# Patient Record
Sex: FEMALE | Race: WHITE | NOT HISPANIC OR LATINO | Employment: OTHER | URBAN - METROPOLITAN AREA
[De-identification: names, ages, dates, MRNs, and addresses within clinical notes are randomized per-mention and may not be internally consistent; named-entity substitution may affect disease eponyms.]

---

## 2017-08-11 RX ORDER — AMLODIPINE BESYLATE 5 MG/1
TABLET ORAL
Qty: 90 TABLET | Refills: 0 | OUTPATIENT
Start: 2017-08-11

## 2017-08-11 RX ORDER — BISOPROLOL FUMARATE 5 MG/1
TABLET, FILM COATED ORAL
Qty: 90 TABLET | Refills: 0 | OUTPATIENT
Start: 2017-08-11

## 2017-08-11 RX ORDER — LISINOPRIL AND HYDROCHLOROTHIAZIDE 20; 12.5 MG/1; MG/1
TABLET ORAL
Qty: 90 TABLET | Refills: 0 | OUTPATIENT
Start: 2017-08-11

## 2017-08-24 ENCOUNTER — OFFICE VISIT (OUTPATIENT)
Dept: INTERNAL MEDICINE | Facility: CLINIC | Age: 77
End: 2017-08-24

## 2017-08-24 VITALS
WEIGHT: 145.6 LBS | DIASTOLIC BLOOD PRESSURE: 62 MMHG | HEIGHT: 64 IN | HEART RATE: 56 BPM | TEMPERATURE: 98 F | SYSTOLIC BLOOD PRESSURE: 150 MMHG | BODY MASS INDEX: 24.86 KG/M2 | OXYGEN SATURATION: 96 %

## 2017-08-24 DIAGNOSIS — Z12.39 BREAST CANCER SCREENING: ICD-10-CM

## 2017-08-24 DIAGNOSIS — I10 ESSENTIAL HYPERTENSION: Primary | ICD-10-CM

## 2017-08-24 DIAGNOSIS — Z12.31 ENCOUNTER FOR SCREENING MAMMOGRAM FOR MALIGNANT NEOPLASM OF BREAST: ICD-10-CM

## 2017-08-24 DIAGNOSIS — Z72.0 TOBACCO ABUSE: ICD-10-CM

## 2017-08-24 PROCEDURE — 99214 OFFICE O/P EST MOD 30 MIN: CPT | Performed by: FAMILY MEDICINE

## 2017-08-24 RX ORDER — BISOPROLOL FUMARATE 5 MG/1
5 TABLET, FILM COATED ORAL DAILY
Qty: 90 TABLET | Refills: 3 | Status: SHIPPED | OUTPATIENT
Start: 2017-08-24 | End: 2018-11-21 | Stop reason: SDUPTHER

## 2017-08-24 RX ORDER — AMLODIPINE BESYLATE 5 MG/1
5 TABLET ORAL DAILY
Qty: 90 TABLET | Refills: 3 | Status: SHIPPED | OUTPATIENT
Start: 2017-08-24 | End: 2018-11-21 | Stop reason: SDUPTHER

## 2017-08-24 RX ORDER — LISINOPRIL AND HYDROCHLOROTHIAZIDE 20; 12.5 MG/1; MG/1
1 TABLET ORAL DAILY
Qty: 90 TABLET | Refills: 3 | Status: SHIPPED | OUTPATIENT
Start: 2017-08-24 | End: 2017-08-28 | Stop reason: SINTOL

## 2017-08-24 NOTE — PROGRESS NOTES
Neeru Reid is a 77 y.o. female.  Seen 08/24/2017    Assessment/Plan   Problem List Items Addressed This Visit        Cardiovascular and Mediastinum    BP (high blood pressure) - Primary    Relevant Medications    lisinopril-hydrochlorothiazide (PRINZIDE,ZESTORETIC) 20-12.5 MG per tablet    bisoprolol (ZEBeta) 5 MG tablet    amLODIPine (NORVASC) 5 MG tablet    Other Relevant Orders    Comprehensive Metabolic Panel       Other    Tobacco abuse    Breast cancer screening    Relevant Orders    Mammo Screening Bilateral With CAD    Encounter for screening mammogram for malignant neoplasm of breast     Relevant Orders    Mammo Screening Bilateral With CAD           Follow-up results, monitor blood pressure goals is 140/90 smoking cessation recommended      Subjective     Chief Complaint   Patient presents with   • Follow up to hypertension     Patient needs medication refills   Health maintenance smoker  Social History   Substance Use Topics   • Smoking status: Current Some Day Smoker     Packs/day: 0.50     Types: Cigarettes   • Smokeless tobacco: Never Used   • Alcohol use No       History of Present Illness   Patient has long-standing hypertension well controlled on beta blocker and calcium channel blocker ARB diuretic no chest pain or shortness of breath she golfs regularly she has no increasing fatigue she smokes one half pack cigarettes over the course of a few days she has no cough or fever or weight loss she does not want any immunizations at this time and she is okay to have a mammogram as part of her health maintenance  The following portions of the patient's history were reviewed and updated as appropriate:PMHroutine: Social history , Past Medical History, Allergies, Current Medications, Active Problem List and Health Maintenance    Review of Systems   Constitutional: Negative for appetite change, fever and unexpected weight change.   HENT: Negative for ear pain, facial swelling and sore throat.    Eyes:  "Negative for pain and visual disturbance.   Respiratory: Negative for chest tightness, shortness of breath and wheezing.    Cardiovascular: Negative for chest pain and palpitations.   Gastrointestinal: Negative for abdominal pain and blood in stool.   Endocrine: Negative.    Genitourinary: Negative for difficulty urinating and hematuria.   Musculoskeletal: Negative for joint swelling.   Neurological: Negative for tremors, seizures and syncope.   Hematological: Negative for adenopathy.   Psychiatric/Behavioral: Negative.        Objective   Vitals:    08/24/17 1331   BP: 150/62   BP Location: Left arm   Patient Position: Sitting   Cuff Size: Adult   Pulse: 56   Temp: 98 °F (36.7 °C)   TempSrc: Oral   SpO2: 96%   Weight: 145 lb 9.6 oz (66 kg)   Height: 64\" (162.6 cm)     Body mass index is 24.99 kg/(m^2).  Physical Exam   Constitutional: She is oriented to person, place, and time. She appears well-developed and well-nourished. No distress.   HENT:   Head: Normocephalic and atraumatic.   Eyes: Conjunctivae and EOM are normal. Pupils are equal, round, and reactive to light. Right eye exhibits no discharge. Left eye exhibits no discharge. No scleral icterus.   Neck: Normal carotid pulses and no JVD present.   Cardiovascular: Normal rate, regular rhythm, normal heart sounds, intact distal pulses and normal pulses.  Exam reveals no gallop.    No murmur heard.  Pulmonary/Chest: Effort normal and breath sounds normal. No respiratory distress. She has no wheezes. She has no rales.   Abdominal: Soft. There is no tenderness.   Musculoskeletal: Normal range of motion.   Neurological: She is alert and oriented to person, place, and time. She exhibits normal muscle tone. Coordination normal.   Skin: Skin is warm. No rash noted. No erythema. No pallor.   Psychiatric: She has a normal mood and affect. Her behavior is normal. Judgment and thought content normal.   Nursing note and vitals reviewed.    Reviewed Data:  No visits with " results within 1 Month(s) from this visit.  Latest known visit with results is:    Office Visit on 04/29/2016   Component Date Value Ref Range Status   • Glucose 04/29/2016 87  65 - 99 mg/dL Final    Comment: The MDRD GFR formula is only valid for adults with stable  renal function between ages 18 and 70.     • BUN 04/29/2016 28* 8 - 23 mg/dL Final   • Creatinine 04/29/2016 1.14* 0.57 - 1.00 mg/dL Final   • eGFR Non African Am 04/29/2016 46* >60 mL/min/1.73 Final   • eGFR African Am 04/29/2016 56* >60 mL/min/1.73 Final   • BUN/Creatinine Ratio 04/29/2016 24.6  7.0 - 25.0 Final   • Sodium 04/29/2016 146* 136 - 145 mmol/L Final   • Potassium 04/29/2016 4.5  3.5 - 5.2 mmol/L Final   • Chloride 04/29/2016 105  98 - 107 mmol/L Final   • Total CO2 04/29/2016 27.6  22.0 - 29.0 mmol/L Final   • Calcium 04/29/2016 10.0  8.6 - 10.5 mg/dL Final   • Total Protein 04/29/2016 7.4  6.0 - 8.5 g/dL Final   • Albumin 04/29/2016 4.50  3.50 - 5.20 g/dL Final   • Globulin 04/29/2016 2.9  gm/dL Final   • A/G Ratio 04/29/2016 1.6  g/dL Final   • Total Bilirubin 04/29/2016 0.2  0.1 - 1.2 mg/dL Final   • Alkaline Phosphatase 04/29/2016 93  39 - 117 U/L Final   • AST (SGOT) 04/29/2016 23  1 - 32 U/L Final   • ALT (SGPT) 04/29/2016 17  1 - 33 U/L Final

## 2017-08-25 ENCOUNTER — TELEPHONE (OUTPATIENT)
Dept: INTERNAL MEDICINE | Facility: CLINIC | Age: 77
End: 2017-08-25

## 2017-08-25 LAB
ALBUMIN SERPL-MCNC: 4.5 G/DL (ref 3.5–5.2)
ALBUMIN/GLOB SERPL: 1.5 G/DL
ALP SERPL-CCNC: 84 U/L (ref 39–117)
ALT SERPL-CCNC: 15 U/L (ref 1–33)
AST SERPL-CCNC: 13 U/L (ref 1–32)
BILIRUB SERPL-MCNC: 0.3 MG/DL (ref 0.1–1.2)
BUN SERPL-MCNC: 27 MG/DL (ref 8–23)
BUN/CREAT SERPL: 20.9 (ref 7–25)
CALCIUM SERPL-MCNC: 9.9 MG/DL (ref 8.6–10.5)
CHLORIDE SERPL-SCNC: 105 MMOL/L (ref 98–107)
CO2 SERPL-SCNC: 24.4 MMOL/L (ref 22–29)
CREAT SERPL-MCNC: 1.29 MG/DL (ref 0.57–1)
GLOBULIN SER CALC-MCNC: 3 GM/DL
GLUCOSE SERPL-MCNC: 99 MG/DL (ref 65–99)
POTASSIUM SERPL-SCNC: 4.8 MMOL/L (ref 3.5–5.2)
PROT SERPL-MCNC: 7.5 G/DL (ref 6–8.5)
SODIUM SERPL-SCNC: 143 MMOL/L (ref 136–145)

## 2017-08-25 NOTE — TELEPHONE ENCOUNTER
----- Message from Jigar Sneed MD sent at 8/25/2017  1:10 PM EDT -----  Stop Prinzide 20/12.5 start lisinopril 20 mg daily #30 RF 3,  appt 1 month

## 2017-08-28 RX ORDER — LISINOPRIL 20 MG/1
TABLET ORAL
Qty: 90 TABLET | Refills: 0 | Status: SHIPPED | OUTPATIENT
Start: 2017-08-28 | End: 2018-09-20 | Stop reason: ALTCHOICE

## 2017-08-28 RX ORDER — LISINOPRIL 20 MG/1
20 TABLET ORAL DAILY
Qty: 30 TABLET | Refills: 0 | Status: SHIPPED | OUTPATIENT
Start: 2017-08-28 | End: 2017-08-28 | Stop reason: SDUPTHER

## 2017-09-06 ENCOUNTER — TELEPHONE (OUTPATIENT)
Dept: INTERNAL MEDICINE | Facility: CLINIC | Age: 77
End: 2017-09-06

## 2017-09-06 DIAGNOSIS — Z12.31 ENCOUNTER FOR SCREENING MAMMOGRAM FOR BREAST CANCER: Primary | ICD-10-CM

## 2017-09-06 NOTE — TELEPHONE ENCOUNTER
"----- Message from Micaela Salazar MA sent at 9/5/2017  5:36 PM EDT -----  Regarding: Cancellation of Order # [id number]  Order number [id number] for the procedure MAMMO SCREENING BILATERAL  W CAD [[id number]] has been canceled by Micaela Salazar MA   [[id number]]. This procedure was ordered by you on Aug 24, 2017 for   the patient Neeru Reid [[id number]]. The reason for   cancellation was \"None\".    This was a future order.  "

## 2018-01-05 ENCOUNTER — OFFICE VISIT (OUTPATIENT)
Dept: INTERNAL MEDICINE | Facility: CLINIC | Age: 78
End: 2018-01-05

## 2018-01-05 VITALS
RESPIRATION RATE: 16 BRPM | DIASTOLIC BLOOD PRESSURE: 62 MMHG | TEMPERATURE: 97 F | HEIGHT: 64 IN | SYSTOLIC BLOOD PRESSURE: 161 MMHG | BODY MASS INDEX: 24.69 KG/M2 | HEART RATE: 51 BPM | OXYGEN SATURATION: 98 % | WEIGHT: 144.6 LBS

## 2018-01-05 DIAGNOSIS — L30.9 ECZEMA, UNSPECIFIED TYPE: Primary | ICD-10-CM

## 2018-01-05 PROCEDURE — 99213 OFFICE O/P EST LOW 20 MIN: CPT | Performed by: FAMILY MEDICINE

## 2018-01-05 RX ORDER — TRIAMCINOLONE ACETONIDE 1 MG/G
CREAM TOPICAL 2 TIMES DAILY
Qty: 45 G | Refills: 1 | Status: SHIPPED | OUTPATIENT
Start: 2018-01-05 | End: 2018-09-20 | Stop reason: SDUPTHER

## 2018-01-05 NOTE — PROGRESS NOTES
"Neeru Reid is a 78 y.o. female.      Assessment/Plan   Problem List Items Addressed This Visit        Musculoskeletal and Integument    Eczema - Primary    Relevant Medications    triamcinolone (KENALOG) 0.1 % cream           5 Lubriderm twice daily applied triamcinolone twice a day specifically to the neck and the lower legs isn't does not improve within 2 weeks follow-up appointment otherwise consultation with dermatology      Chief Complaint   Patient presents with   • Rash     neck, arms, legs x 6 days. red and itching     Social History   Substance Use Topics   • Smoking status: Current Some Day Smoker     Packs/day: 0.50     Types: Cigarettes   • Smokeless tobacco: Never Used   • Alcohol use No       History of Present Illness   Rash started approximately a week ago neck arms and legs bilaterally some itching she has used over-the-counter Benadryl zinc oxide cream minimal improvement she is moisturizing soaps showers every other day no other change in routine her habits no fevers chills shakes  The following portions of the patient's history were reviewed and updated as appropriate:PMHroutine: Social history , Past Medical History, Allergies, Current Medications, Active Problem List and Health Maintenance    Review of Systems   Constitutional: Negative.    Allergic/Immunologic: Negative.    Hematological: Negative.        Objective   Vitals:    01/05/18 0915   BP: 161/62   BP Location: Left arm   Patient Position: Sitting   Cuff Size: Adult   Pulse: 51   Resp: 16   Temp: 97 °F (36.1 °C)   TempSrc: Oral   SpO2: 98%   Weight: 65.6 kg (144 lb 9.6 oz)   Height: 162.6 cm (64\")     Body mass index is 24.82 kg/(m^2).  Physical Exam   Constitutional: She appears well-developed and well-nourished.   HENT:   Head: Normocephalic and atraumatic.   Cardiovascular: Normal rate and regular rhythm.    Skin:   Erythema with some confluence at the posterior neck and bilateral lower arms and lower legs   Psychiatric: She has " a normal mood and affect. Her behavior is normal. Judgment and thought content normal.   Nursing note and vitals reviewed.    Reviewed Data:  No visits with results within 1 Month(s) from this visit.  Latest known visit with results is:    Office Visit on 08/24/2017   Component Date Value Ref Range Status   • Glucose 08/24/2017 99  65 - 99 mg/dL Final   • BUN 08/24/2017 27* 8 - 23 mg/dL Final   • Creatinine 08/24/2017 1.29* 0.57 - 1.00 mg/dL Final   • eGFR Non  Am 08/24/2017 40* >60 mL/min/1.73 Final    Comment: The MDRD GFR formula is only valid for adults with stable  renal function between ages 18 and 70.     • eGFR  Am 08/24/2017 49* >60 mL/min/1.73 Final   • BUN/Creatinine Ratio 08/24/2017 20.9  7.0 - 25.0 Final   • Sodium 08/24/2017 143  136 - 145 mmol/L Final   • Potassium 08/24/2017 4.8  3.5 - 5.2 mmol/L Final   • Chloride 08/24/2017 105  98 - 107 mmol/L Final   • Total CO2 08/24/2017 24.4  22.0 - 29.0 mmol/L Final   • Calcium 08/24/2017 9.9  8.6 - 10.5 mg/dL Final   • Total Protein 08/24/2017 7.5  6.0 - 8.5 g/dL Final   • Albumin 08/24/2017 4.50  3.50 - 5.20 g/dL Final   • Globulin 08/24/2017 3.0  gm/dL Final   • A/G Ratio 08/24/2017 1.5  g/dL Final   • Total Bilirubin 08/24/2017 0.3  0.1 - 1.2 mg/dL Final   • Alkaline Phosphatase 08/24/2017 84  39 - 117 U/L Final   • AST (SGOT) 08/24/2017 13  1 - 32 U/L Final   • ALT (SGPT) 08/24/2017 15  1 - 33 U/L Final

## 2018-09-20 ENCOUNTER — OFFICE VISIT (OUTPATIENT)
Dept: INTERNAL MEDICINE | Facility: CLINIC | Age: 78
End: 2018-09-20

## 2018-09-20 VITALS
SYSTOLIC BLOOD PRESSURE: 150 MMHG | DIASTOLIC BLOOD PRESSURE: 56 MMHG | TEMPERATURE: 98.2 F | OXYGEN SATURATION: 97 % | BODY MASS INDEX: 23.83 KG/M2 | HEIGHT: 64 IN | HEART RATE: 54 BPM | WEIGHT: 139.6 LBS

## 2018-09-20 DIAGNOSIS — Z72.0 TOBACCO ABUSE: ICD-10-CM

## 2018-09-20 DIAGNOSIS — I10 ESSENTIAL HYPERTENSION: Primary | ICD-10-CM

## 2018-09-20 DIAGNOSIS — L30.9 ECZEMA, UNSPECIFIED TYPE: ICD-10-CM

## 2018-09-20 DIAGNOSIS — L40.9 PSORIASIS: ICD-10-CM

## 2018-09-20 DIAGNOSIS — R79.89 CREATININE ELEVATION: ICD-10-CM

## 2018-09-20 LAB
ALBUMIN SERPL-MCNC: 4.4 G/DL (ref 3.5–5.2)
ALBUMIN/GLOB SERPL: 1.5 G/DL
ALP SERPL-CCNC: 89 U/L (ref 39–117)
ALT SERPL-CCNC: 20 U/L (ref 1–33)
AST SERPL-CCNC: 22 U/L (ref 1–32)
BILIRUB SERPL-MCNC: 0.4 MG/DL (ref 0.1–1.2)
BUN SERPL-MCNC: 30 MG/DL (ref 8–23)
BUN/CREAT SERPL: 22.6 (ref 7–25)
CALCIUM SERPL-MCNC: 10 MG/DL (ref 8.6–10.5)
CHLORIDE SERPL-SCNC: 107 MMOL/L (ref 98–107)
CO2 SERPL-SCNC: 23.8 MMOL/L (ref 22–29)
CREAT SERPL-MCNC: 1.33 MG/DL (ref 0.57–1)
GLOBULIN SER CALC-MCNC: 2.9 GM/DL
GLUCOSE SERPL-MCNC: 90 MG/DL (ref 65–99)
POTASSIUM SERPL-SCNC: 4.7 MMOL/L (ref 3.5–5.2)
PROT SERPL-MCNC: 7.3 G/DL (ref 6–8.5)
SODIUM SERPL-SCNC: 144 MMOL/L (ref 136–145)

## 2018-09-20 PROCEDURE — G0438 PPPS, INITIAL VISIT: HCPCS | Performed by: FAMILY MEDICINE

## 2018-09-20 PROCEDURE — 99214 OFFICE O/P EST MOD 30 MIN: CPT | Performed by: FAMILY MEDICINE

## 2018-09-20 PROCEDURE — 99406 BEHAV CHNG SMOKING 3-10 MIN: CPT | Performed by: FAMILY MEDICINE

## 2018-09-20 PROCEDURE — 96160 PT-FOCUSED HLTH RISK ASSMT: CPT | Performed by: FAMILY MEDICINE

## 2018-09-20 RX ORDER — LISINOPRIL AND HYDROCHLOROTHIAZIDE 20; 12.5 MG/1; MG/1
1 TABLET ORAL DAILY
COMMUNITY
Start: 2018-07-25 | End: 2018-09-25 | Stop reason: ALTCHOICE

## 2018-09-20 RX ORDER — CLOBETASOL PROPIONATE 0.5 MG/ML
LOTION TOPICAL DAILY
Qty: 118 ML | Refills: 2 | Status: SHIPPED | OUTPATIENT
Start: 2018-09-20

## 2018-09-20 RX ORDER — TRIAMCINOLONE ACETONIDE 1 MG/G
CREAM TOPICAL 2 TIMES DAILY
Qty: 45 G | Refills: 1 | Status: SHIPPED | OUTPATIENT
Start: 2018-09-20 | End: 2020-01-08 | Stop reason: SDUPTHER

## 2018-09-20 NOTE — PROGRESS NOTES
QUICK REFERENCE INFORMATION:  The ABCs of the Annual Wellness Visit    Initial Medicare Wellness Visit    HEALTH RISK ASSESSMENT    1940    Recent Hospitalizations:  No hospitalization(s) within the last year..        Current Medical Providers:  Patient Care Team:  Jigar Sneed MD as PCP - General (Family Medicine)        Smoking Status:  History   Smoking Status   • Current Some Day Smoker   • Packs/day: 0.50   • Types: Cigarettes   Smokeless Tobacco   • Never Used       Alcohol Consumption:  History   Alcohol Use No       Depression Screen:   PHQ-2/PHQ-9 Depression Screening 9/20/2018   Little interest or pleasure in doing things 0   Feeling down, depressed, or hopeless 0   Total Score 0       Health Habits and Functional and Cognitive Screening:  Functional & Cognitive Status 9/20/2018   Do you have difficulty preparing food and eating? No   Do you have difficulty bathing yourself, getting dressed or grooming yourself? No   Do you have difficulty using the toilet? No   Do you have difficulty moving around from place to place? No   Do you have trouble with steps or getting out of a bed or a chair? No   In the past year have you fallen or experienced a near fall? No   Current Diet Limited Junk Food   Dental Exam Not up to date   Eye Exam Not up to date   Exercise (times per week) 2 times per week   Current Exercise Activities Include Walking   Do you need help using the phone?  No   Are you deaf or do you have serious difficulty hearing?  No   Do you need help with transportation? No   Do you need help shopping? No   Do you need help preparing meals?  No   Do you need help with housework?  No   Do you need help with laundry? No   Do you need help taking your medications? No   Do you need help managing money? No   Do you ever drive or ride in a car without wearing a seat belt? No   Have you felt unusual stress, anger or loneliness in the last month? Yes   Who do you live with? Other   If you need help, do  you have trouble finding someone available to you? No   Have you been bothered in the last four weeks by sexual problems? No   Do you have difficulty concentrating, remembering or making decisions? No           Does the patient have evidence of cognitive impairment? No    Asiprin use counseling: Taking ASA appropriately as indicated      Recent Lab Results:    Visual Acuity:  No exam data present    Age-appropriate Screening Schedule:  Refer to the list below for future screening recommendations based on patient's age, sex and/or medical conditions. Orders for these recommended tests are listed in the plan section. The patient has been provided with a written plan.    Health Maintenance   Topic Date Due   • MAMMOGRAM  01/30/2017   • INFLUENZA VACCINE  08/01/2018   • COLONOSCOPY  01/30/2025   • PNEUMOCOCCAL VACCINES (65+ LOW/MEDIUM RISK)  Excluded   • TDAP/TD VACCINES  Excluded   • ZOSTER VACCINE  Excluded        Subjective   History of Present Illness    Neeru Reid is a 78 y.o. female who presents for an Annual Wellness Visit.    The following portions of the patient's history were reviewed and updated as appropriate: allergies, current medications, past family history, past medical history, past social history, past surgical history and problem list.    Outpatient Medications Prior to Visit   Medication Sig Dispense Refill   • amLODIPine (NORVASC) 5 MG tablet Take 1 tablet by mouth Daily. 90 tablet 3   • aspirin 81 MG tablet Take 81 mg by mouth daily.     • bisoprolol (ZEBeta) 5 MG tablet Take 1 tablet by mouth Daily. 90 tablet 3   • Naproxen Sodium (ALEVE PO) Take 2 tablets by mouth daily as needed.     • triamcinolone (KENALOG) 0.1 % cream Apply  topically 2 (Two) Times a Day. Affected rash aresas 45 g 1   • lisinopril (PRINIVIL,ZESTRIL) 20 MG tablet TAKE 1 TABLET BY MOUTH DAILY 90 tablet 0     No facility-administered medications prior to visit.        Patient Active Problem List   Diagnosis   • Abnormal  "finding on mammography   • CAFL (chronic airflow limitation) (CMS/[facility])   • Blood glucose elevated   • BP (high blood pressure)   • Creatinine elevation   • Tobacco abuse   • Breast cancer screening   • Encounter for screening mammogram for malignant neoplasm of breast    • Eczema   • Psoriasis       Advance Care Planning:  has an advance directive - a copy HAS NOT been provided. Have asked the patient to send this to us to add to record.    Identification of Risk Factors:  Risk factors include: cardiovascular risk and tobacco use.    Review of Systems    Compared to one year ago, the patient feels her physical health is the same.  Compared to one year ago, the patient feels her mental health is the same.    Objective     Physical Exam    Vitals:    09/20/18 0934   BP: 150/56   BP Location: Left arm   Patient Position: Sitting   Cuff Size: Adult   Pulse: 54   Temp: 98.2 °F (36.8 °C)   TempSrc: Oral   SpO2: 97%   Weight: 63.3 kg (139 lb 9.6 oz)   Height: 162.6 cm (64\")   PainSc: 0-No pain       Patient's Body mass index is 23.96 kg/m². BMI is within normal parameters. No follow-up required.      Assessment/Plan   Patient Self-Management and Personalized Health Advice  The patient has been provided with information about: exercise and tobacco cessation and preventive services including:   · Advance directive.    Visit Diagnoses:    ICD-10-CM ICD-9-CM   1. Essential hypertension I10 401.9   2. Eczema, unspecified type L30.9 692.9   3. Creatinine elevation R79.89 790.99   4. Psoriasis L40.9 696.1   5. Tobacco abuse Z72.0 305.1       Orders Placed This Encounter   Procedures   • Comprehensive Metabolic Panel       Outpatient Encounter Prescriptions as of 9/20/2018   Medication Sig Dispense Refill   • amLODIPine (NORVASC) 5 MG tablet Take 1 tablet by mouth Daily. 90 tablet 3   • aspirin 81 MG tablet Take 81 mg by mouth daily.     • bisoprolol (ZEBeta) 5 MG tablet Take 1 tablet by mouth Daily. 90 tablet 3   • " lisinopril-hydrochlorothiazide (PRINZIDE,ZESTORETIC) 20-12.5 MG per tablet Take 1 tablet by mouth Daily.     • Naproxen Sodium (ALEVE PO) Take 2 tablets by mouth daily as needed.     • triamcinolone (KENALOG) 0.1 % cream Apply  topically to the appropriate area as directed 2 (Two) Times a Day. Affected rash aresas 45 g 1   • [DISCONTINUED] triamcinolone (KENALOG) 0.1 % cream Apply  topically 2 (Two) Times a Day. Affected rash aresas 45 g 1   • clobetasol (CLOBEX) 0.05 % lotion Apply  topically to the appropriate area as directed Daily. 118 mL 2   • [DISCONTINUED] lisinopril (PRINIVIL,ZESTRIL) 20 MG tablet TAKE 1 TABLET BY MOUTH DAILY 90 tablet 0     No facility-administered encounter medications on file as of 9/20/2018.        Reviewed use of high risk medication in the elderly: yes  Reviewed for potential of harmful drug interactions in the elderly: yes    Follow Up:  Chronic problems    An After Visit Summary and PPPS with all of these plans were given to the patient.

## 2018-09-20 NOTE — PROGRESS NOTES
Neeru Reid is a 78 y.o. female.      Assessment/Plan   Problem List Items Addressed This Visit        Cardiovascular and Mediastinum    BP (high blood pressure) - Primary    Relevant Medications    lisinopril-hydrochlorothiazide (PRINZIDE,ZESTORETIC) 20-12.5 MG per tablet    Other Relevant Orders    Comprehensive Metabolic Panel       Musculoskeletal and Integument    Eczema    Relevant Medications    triamcinolone (KENALOG) 0.1 % cream    clobetasol (CLOBEX) 0.05 % lotion    Psoriasis    Relevant Medications    triamcinolone (KENALOG) 0.1 % cream    clobetasol (CLOBEX) 0.05 % lotion       Other    Creatinine elevation    Tobacco abuse         follow-up results of blood work from hypertension management added clobetasol for areas along the hairline for psoriasis she will otherwise monitor blood pressure with goal less than 140/90.  Encourage smoking cessation    Return in about 6 months (around 3/20/2019), or if symptoms worsen or fail to improve, for Recheck, Next scheduled follow up.  There are no Patient Instructions on file for this visit.    Chief Complaint   Patient presents with   • Psoriasis - scalp   • follow up to hypertension   • Initial Medicare Wellness     Social History   Substance Use Topics   • Smoking status: Current Some Day Smoker     Packs/day: 0.50     Types: Cigarettes   • Smokeless tobacco: Never Used   • Alcohol use No       History of Present Illness   Patient follows up for chronic problems of psoriasis and hypertension she is also still an active smoker she has no chest pain shortness of breath or increased fatigue she's feeling fairly stable although should like trial of a different type of lotion for her psoriasis is mostly in the scalp.  We discussed negative effects of smoking which she is aware of the long-term smoker and difficult for her to quit at this point total of 3 minutes was spent discussing tobacco cessation and smoking  The following portions of the patient's history  "were reviewed and updated as appropriate:PMHroutine: Social history , Past Medical History, Surgical history , Allergies, Current Medications, Active Problem List, Family History and Health Maintenance    Review of Systems   Constitutional: Negative for activity change, appetite change and unexpected weight change.   HENT: Negative for nosebleeds and trouble swallowing.    Eyes: Negative for pain and visual disturbance.   Respiratory: Negative for chest tightness, shortness of breath and wheezing.    Cardiovascular: Negative for chest pain and palpitations.   Gastrointestinal: Negative for abdominal pain and blood in stool.   Endocrine: Negative.    Genitourinary: Negative for difficulty urinating and hematuria.   Musculoskeletal: Negative for joint swelling.   Skin: Negative for color change and rash.   Allergic/Immunologic: Negative.    Neurological: Negative for syncope and speech difficulty.   Hematological: Negative for adenopathy.   Psychiatric/Behavioral: Negative for agitation and confusion.   All other systems reviewed and are negative.      Objective   Vitals:    09/20/18 0934   BP: 150/56   BP Location: Left arm   Patient Position: Sitting   Cuff Size: Adult   Pulse: 54   Temp: 98.2 °F (36.8 °C)   TempSrc: Oral   SpO2: 97%   Weight: 63.3 kg (139 lb 9.6 oz)   Height: 162.6 cm (64\")     Body mass index is 23.96 kg/m².  Physical Exam   Constitutional: She is oriented to person, place, and time. She appears well-developed and well-nourished. No distress.   HENT:   Head: Normocephalic and atraumatic.   Eyes: Pupils are equal, round, and reactive to light. Conjunctivae and EOM are normal. Right eye exhibits no discharge. Left eye exhibits no discharge. No scleral icterus.   Neck: Normal range of motion. Neck supple. No tracheal deviation present. No thyromegaly present.   Cardiovascular: Normal rate, regular rhythm, normal heart sounds, intact distal pulses and normal pulses.  Exam reveals no gallop.    No " murmur heard.  Pulmonary/Chest: Effort normal and breath sounds normal. No respiratory distress. She has no wheezes. She has no rales.   Musculoskeletal: Normal range of motion.   Neurological: She is alert and oriented to person, place, and time. She exhibits normal muscle tone. Coordination normal.   Skin: Skin is warm. Rash noted. No erythema. No pallor.   Psychiatric: She has a normal mood and affect. Her behavior is normal. Judgment and thought content normal.   Nursing note and vitals reviewed.    Reviewed Data:  No visits with results within 1 Month(s) from this visit.   Latest known visit with results is:   Office Visit on 08/24/2017   Component Date Value Ref Range Status   • Glucose 08/24/2017 99  65 - 99 mg/dL Final   • BUN 08/24/2017 27* 8 - 23 mg/dL Final   • Creatinine 08/24/2017 1.29* 0.57 - 1.00 mg/dL Final   • eGFR Non  Am 08/24/2017 40* >60 mL/min/1.73 Final    Comment: The MDRD GFR formula is only valid for adults with stable  renal function between ages 18 and 70.     • eGFR  Am 08/24/2017 49* >60 mL/min/1.73 Final   • BUN/Creatinine Ratio 08/24/2017 20.9  7.0 - 25.0 Final   • Sodium 08/24/2017 143  136 - 145 mmol/L Final   • Potassium 08/24/2017 4.8  3.5 - 5.2 mmol/L Final   • Chloride 08/24/2017 105  98 - 107 mmol/L Final   • Total CO2 08/24/2017 24.4  22.0 - 29.0 mmol/L Final   • Calcium 08/24/2017 9.9  8.6 - 10.5 mg/dL Final   • Total Protein 08/24/2017 7.5  6.0 - 8.5 g/dL Final   • Albumin 08/24/2017 4.50  3.50 - 5.20 g/dL Final   • Globulin 08/24/2017 3.0  gm/dL Final   • A/G Ratio 08/24/2017 1.5  g/dL Final   • Total Bilirubin 08/24/2017 0.3  0.1 - 1.2 mg/dL Final   • Alkaline Phosphatase 08/24/2017 84  39 - 117 U/L Final   • AST (SGOT) 08/24/2017 13  1 - 32 U/L Final   • ALT (SGPT) 08/24/2017 15  1 - 33 U/L Final

## 2018-09-24 ENCOUNTER — TELEPHONE (OUTPATIENT)
Dept: INTERNAL MEDICINE | Facility: CLINIC | Age: 78
End: 2018-09-24

## 2018-09-24 DIAGNOSIS — R79.89 ELEVATED SERUM CREATININE: Primary | ICD-10-CM

## 2018-09-24 NOTE — TELEPHONE ENCOUNTER
Patient called asking which blood pressure medication she was to take.  She was notified that her labs are okay but on 8/25/17 she was told to take lisinopril 20 mg instead of prinzide 20/12.5 due her her lab results. When patient was last seen on 9/20/18 she had been taking the Prinzide again but she did not know for how long.  Please advise.

## 2018-09-24 NOTE — TELEPHONE ENCOUNTER
Stop lisinopril with hydrochlorothiazide.  Start lisinopril 20 mg 1 daily #30 refill 3 check BMP 2 months

## 2018-09-25 RX ORDER — LISINOPRIL 20 MG/1
20 TABLET ORAL DAILY
Qty: 90 TABLET | Refills: 0 | Status: SHIPPED | OUTPATIENT
Start: 2018-09-25 | End: 2019-01-18 | Stop reason: SDUPTHER

## 2018-09-25 NOTE — TELEPHONE ENCOUNTER
LMA - patient notified per HIPAA.  Patient asked to call to schedule a non fasting lab appointment for 2 months.

## 2018-11-21 RX ORDER — BISOPROLOL FUMARATE 5 MG/1
5 TABLET, FILM COATED ORAL DAILY
Qty: 90 TABLET | Refills: 0 | Status: SHIPPED | OUTPATIENT
Start: 2018-11-21 | End: 2019-05-14 | Stop reason: SDUPTHER

## 2018-11-21 RX ORDER — AMLODIPINE BESYLATE 5 MG/1
5 TABLET ORAL DAILY
Qty: 90 TABLET | Refills: 0 | Status: SHIPPED | OUTPATIENT
Start: 2018-11-21 | End: 2019-05-14 | Stop reason: SDUPTHER

## 2018-11-26 ENCOUNTER — RESULTS ENCOUNTER (OUTPATIENT)
Dept: INTERNAL MEDICINE | Facility: CLINIC | Age: 78
End: 2018-11-26

## 2018-11-26 DIAGNOSIS — R79.89 ELEVATED SERUM CREATININE: ICD-10-CM

## 2018-12-06 ENCOUNTER — TELEPHONE (OUTPATIENT)
Dept: ORTHOPEDIC SURGERY | Facility: CLINIC | Age: 78
End: 2018-12-06

## 2018-12-06 NOTE — TELEPHONE ENCOUNTER
"OPSE patient having LEFT HIP pain for \"a few days\" was scheduled for [facility] 1st available OPSE appt on 01/15/18 @EP - Patient had Left MADELINE 2009 & Right MADELINE 5/03/07 both by RBB - patient asking if RBB would see her before the end of the year? Patient said she had to \"sit in a chair to sleep last night\" Thanks /srh  "

## 2019-01-18 RX ORDER — LISINOPRIL 20 MG/1
20 TABLET ORAL DAILY
Qty: 90 TABLET | Refills: 0 | Status: SHIPPED | OUTPATIENT
Start: 2019-01-18 | End: 2019-05-14 | Stop reason: SDUPTHER

## 2019-05-01 RX ORDER — AMLODIPINE BESYLATE 5 MG/1
5 TABLET ORAL DAILY
Qty: 90 TABLET | Refills: 0 | OUTPATIENT
Start: 2019-05-01

## 2019-05-01 RX ORDER — BISOPROLOL FUMARATE 5 MG/1
5 TABLET, FILM COATED ORAL DAILY
Qty: 90 TABLET | Refills: 0 | OUTPATIENT
Start: 2019-05-01

## 2019-05-10 ENCOUNTER — OFFICE VISIT (OUTPATIENT)
Dept: INTERNAL MEDICINE | Facility: CLINIC | Age: 79
End: 2019-05-10

## 2019-05-10 VITALS
DIASTOLIC BLOOD PRESSURE: 74 MMHG | OXYGEN SATURATION: 95 % | WEIGHT: 144.1 LBS | SYSTOLIC BLOOD PRESSURE: 176 MMHG | HEART RATE: 51 BPM | TEMPERATURE: 98.4 F | BODY MASS INDEX: 24.73 KG/M2

## 2019-05-10 DIAGNOSIS — Z72.0 TOBACCO ABUSE: ICD-10-CM

## 2019-05-10 DIAGNOSIS — R74.8 LOW SERUM HDL: ICD-10-CM

## 2019-05-10 DIAGNOSIS — R09.89 BRUIT OF RIGHT CAROTID ARTERY: ICD-10-CM

## 2019-05-10 DIAGNOSIS — I10 ESSENTIAL HYPERTENSION: Primary | ICD-10-CM

## 2019-05-10 DIAGNOSIS — R79.89 CREATININE ELEVATION: ICD-10-CM

## 2019-05-10 DIAGNOSIS — R79.9 ABNORMAL FINDING OF BLOOD CHEMISTRY: ICD-10-CM

## 2019-05-10 PROCEDURE — 99406 BEHAV CHNG SMOKING 3-10 MIN: CPT | Performed by: FAMILY MEDICINE

## 2019-05-10 PROCEDURE — 99214 OFFICE O/P EST MOD 30 MIN: CPT | Performed by: FAMILY MEDICINE

## 2019-05-10 NOTE — PROGRESS NOTES
Neeru Reid is a 79 y.o. female.      Assessment/Plan   Problem List Items Addressed This Visit        Cardiovascular and Mediastinum    Essential hypertension - Primary    Relevant Orders    Comprehensive Metabolic Panel    Bruit of right carotid artery    Relevant Orders    Duplex Carotid Ultrasound CAR    Lipid Panel       Other    Creatinine elevation    Tobacco abuse    Low serum HDL      Other Visit Diagnoses     Abnormal finding of blood chemistry         Relevant Orders    Lipid Panel         All up results of blood work for ongoing management of chronic problems return sooner if symptomatic of neurologic changes patient declines mammography  4 minutes was spent discussing smoking cessation smoking  Return if symptoms worsen or fail to improve, for Recheck.      Chief Complaint   Patient presents with   • follow up to hypertension   Dyslipidemia smoker abdominal fullness  Social History     Tobacco Use   • Smoking status: Current Some Day Smoker     Packs/day: 0.50     Types: Cigarettes   • Smokeless tobacco: Never Used   Substance Use Topics   • Alcohol use: No   • Drug use: Not on file       History of Present Illness   Patient follows up for chronic problems of hypertension dyslipidemia active smoker elevated creatinine she has no acute concerns feels fairly stable there is no dizziness she is not interested in smoking cessation at this time she home has home blood pressure readings and they are always around 140/80.  She feels over the last 4 months her abdomen is been more bloated there is no significant change in her bowels other than she is using probiotics and no more soft than formed not black or bloody she occasionally uses Pepto-Bismol she does not see any black or blood in the stool there is no abdominal pain  The following portions of the patient's history were reviewed and updated as appropriate:PMHroutine: Social history , Allergies, Current Medications, Active Problem List and Health  Maintenance    Review of Systems   Constitutional: Negative for activity change, appetite change and unexpected weight change.   HENT: Negative for nosebleeds and trouble swallowing.    Eyes: Negative for pain and visual disturbance.   Respiratory: Negative for chest tightness, shortness of breath and wheezing.    Cardiovascular: Negative for chest pain and palpitations.   Gastrointestinal: Negative for abdominal pain and blood in stool.        Nonpainful abdominal fullness   Endocrine: Negative.    Genitourinary: Negative for difficulty urinating and hematuria.   Musculoskeletal: Negative for joint swelling.   Skin: Negative for color change and rash.   Allergic/Immunologic: Negative.    Neurological: Negative for dizziness, tremors, seizures, syncope, speech difficulty, weakness, light-headedness, numbness and headaches.   Hematological: Negative for adenopathy.   Psychiatric/Behavioral: Negative for agitation and confusion.   All other systems reviewed and are negative.      Objective   Vitals:    05/10/19 1050   BP: 176/74   BP Location: Right arm   Patient Position: Sitting   Cuff Size: Adult   Pulse: 51   Temp: 98.4 °F (36.9 °C)   TempSrc: Oral   SpO2: 95%   Weight: 65.4 kg (144 lb 1.6 oz)     Body mass index is 24.73 kg/m².  Physical Exam   Constitutional: She is oriented to person, place, and time. She appears well-developed and well-nourished. No distress.   HENT:   Head: Normocephalic and atraumatic.   Eyes: Conjunctivae and EOM are normal. Pupils are equal, round, and reactive to light. Right eye exhibits no discharge. Left eye exhibits no discharge. No scleral icterus.   Neck: Normal range of motion. Neck supple. No tracheal deviation present. No thyromegaly present.   Cardiovascular: Normal rate, regular rhythm, normal heart sounds, intact distal pulses and normal pulses. Exam reveals no gallop.   No murmur heard.  Pulmonary/Chest: Effort normal and breath sounds normal. No respiratory distress. She has  no wheezes. She has no rales.   Abdominal: Soft. Bowel sounds are normal. She exhibits no distension and no mass. There is no tenderness. There is no rebound and no guarding. No hernia.   Musculoskeletal: Normal range of motion. She exhibits no edema.   Neurological: She is alert and oriented to person, place, and time. She exhibits normal muscle tone. Coordination normal.   Skin: Skin is warm. No rash noted. No erythema. No pallor.   Psychiatric: She has a normal mood and affect. Her behavior is normal. Judgment and thought content normal.   Nursing note and vitals reviewed.    Reviewed Data:  No visits with results within 1 Month(s) from this visit.   Latest known visit with results is:   Office Visit on 09/20/2018   Component Date Value Ref Range Status   • Glucose 09/20/2018 90  65 - 99 mg/dL Final   • BUN 09/20/2018 30* 8 - 23 mg/dL Final   • Creatinine 09/20/2018 1.33* 0.57 - 1.00 mg/dL Final   • eGFR Non  Am 09/20/2018 39* >60 mL/min/1.73 Final    Comment: The MDRD GFR formula is only valid for adults with stable  renal function between ages 18 and 70.     • eGFR  Am 09/20/2018 47* >60 mL/min/1.73 Final   • BUN/Creatinine Ratio 09/20/2018 22.6  7.0 - 25.0 Final   • Sodium 09/20/2018 144  136 - 145 mmol/L Final   • Potassium 09/20/2018 4.7  3.5 - 5.2 mmol/L Final   • Chloride 09/20/2018 107  98 - 107 mmol/L Final   • Total CO2 09/20/2018 23.8  22.0 - 29.0 mmol/L Final   • Calcium 09/20/2018 10.0  8.6 - 10.5 mg/dL Final   • Total Protein 09/20/2018 7.3  6.0 - 8.5 g/dL Final   • Albumin 09/20/2018 4.40  3.50 - 5.20 g/dL Final   • Globulin 09/20/2018 2.9  gm/dL Final   • A/G Ratio 09/20/2018 1.5  g/dL Final   • Total Bilirubin 09/20/2018 0.4  0.1 - 1.2 mg/dL Final   • Alkaline Phosphatase 09/20/2018 89  39 - 117 U/L Final   • AST (SGOT) 09/20/2018 22  1 - 32 U/L Final   • ALT (SGPT) 09/20/2018 20  1 - 33 U/L Final

## 2019-05-11 LAB
ALBUMIN SERPL-MCNC: 4 G/DL (ref 3.5–5.2)
ALBUMIN/GLOB SERPL: 1.3 G/DL
ALP SERPL-CCNC: 97 U/L (ref 39–117)
ALT SERPL-CCNC: 17 U/L (ref 1–33)
AST SERPL-CCNC: 21 U/L (ref 1–32)
BILIRUB SERPL-MCNC: 0.3 MG/DL (ref 0.2–1.2)
BUN SERPL-MCNC: 24 MG/DL (ref 8–23)
BUN/CREAT SERPL: 20.2 (ref 7–25)
CALCIUM SERPL-MCNC: 10 MG/DL (ref 8.6–10.5)
CHLORIDE SERPL-SCNC: 107 MMOL/L (ref 98–107)
CHOLEST SERPL-MCNC: 131 MG/DL (ref 0–200)
CO2 SERPL-SCNC: 23.4 MMOL/L (ref 22–29)
CREAT SERPL-MCNC: 1.19 MG/DL (ref 0.57–1)
GLOBULIN SER CALC-MCNC: 3 GM/DL
GLUCOSE SERPL-MCNC: 96 MG/DL (ref 65–99)
HDLC SERPL-MCNC: 38 MG/DL (ref 40–60)
LDLC SERPL CALC-MCNC: 62 MG/DL (ref 0–100)
POTASSIUM SERPL-SCNC: 4.9 MMOL/L (ref 3.5–5.2)
PROT SERPL-MCNC: 7 G/DL (ref 6–8.5)
SODIUM SERPL-SCNC: 142 MMOL/L (ref 136–145)
TRIGL SERPL-MCNC: 156 MG/DL (ref 0–150)
VLDLC SERPL CALC-MCNC: 31.2 MG/DL

## 2019-05-14 RX ORDER — LISINOPRIL 20 MG/1
20 TABLET ORAL DAILY
Qty: 90 TABLET | Refills: 0 | Status: SHIPPED | OUTPATIENT
Start: 2019-05-14 | End: 2019-08-12 | Stop reason: SDUPTHER

## 2019-05-14 RX ORDER — AMLODIPINE BESYLATE 5 MG/1
5 TABLET ORAL DAILY
Qty: 90 TABLET | Refills: 0 | Status: SHIPPED | OUTPATIENT
Start: 2019-05-14 | End: 2019-08-12 | Stop reason: SDUPTHER

## 2019-05-14 RX ORDER — BISOPROLOL FUMARATE 5 MG/1
5 TABLET, FILM COATED ORAL DAILY
Qty: 90 TABLET | Refills: 0 | Status: SHIPPED | OUTPATIENT
Start: 2019-05-14 | End: 2019-08-12 | Stop reason: SDUPTHER

## 2019-05-17 ENCOUNTER — HOSPITAL ENCOUNTER (EMERGENCY)
Facility: HOSPITAL | Age: 79
Discharge: HOME | End: 2019-05-17
Attending: EMERGENCY MEDICINE | Admitting: EMERGENCY MEDICINE

## 2019-05-17 VITALS
OXYGEN SATURATION: 96 % | SYSTOLIC BLOOD PRESSURE: 190 MMHG | TEMPERATURE: 99.2 F | RESPIRATION RATE: 16 BRPM | DIASTOLIC BLOOD PRESSURE: 70 MMHG | HEART RATE: 78 BPM | HEIGHT: 64 IN | BODY MASS INDEX: 24.73 KG/M2

## 2019-05-17 DIAGNOSIS — S46.812A STRAIN OF LEFT TRAPEZIUS MUSCLE, INITIAL ENCOUNTER: Primary | ICD-10-CM

## 2019-05-17 PROCEDURE — 99283 EMERGENCY DEPT VISIT LOW MDM: CPT

## 2019-05-17 RX ORDER — CYCLOBENZAPRINE HCL 10 MG
10 TABLET ORAL 3 TIMES DAILY PRN
Qty: 30 TABLET | Refills: 0 | Status: SHIPPED | OUTPATIENT
Start: 2019-05-17 | End: 2020-01-08

## 2019-08-13 RX ORDER — LISINOPRIL 20 MG/1
20 TABLET ORAL DAILY
Qty: 90 TABLET | Refills: 0 | Status: SHIPPED | OUTPATIENT
Start: 2019-08-13 | End: 2020-01-08 | Stop reason: SDUPTHER

## 2019-08-13 RX ORDER — AMLODIPINE BESYLATE 5 MG/1
5 TABLET ORAL DAILY
Qty: 90 TABLET | Refills: 0 | Status: SHIPPED | OUTPATIENT
Start: 2019-08-13 | End: 2020-01-08 | Stop reason: SDUPTHER

## 2019-08-13 RX ORDER — BISOPROLOL FUMARATE 5 MG/1
5 TABLET, FILM COATED ORAL DAILY
Qty: 90 TABLET | Refills: 0 | Status: SHIPPED | OUTPATIENT
Start: 2019-08-13 | End: 2020-01-08 | Stop reason: SDUPTHER

## 2020-01-02 RX ORDER — LISINOPRIL 20 MG/1
20 TABLET ORAL DAILY
Qty: 90 TABLET | Refills: 0 | OUTPATIENT
Start: 2020-01-02

## 2020-01-02 RX ORDER — BISOPROLOL FUMARATE 5 MG/1
5 TABLET, FILM COATED ORAL DAILY
Qty: 90 TABLET | Refills: 0 | OUTPATIENT
Start: 2020-01-02

## 2020-01-08 ENCOUNTER — OFFICE VISIT (OUTPATIENT)
Dept: INTERNAL MEDICINE | Facility: CLINIC | Age: 80
End: 2020-01-08

## 2020-01-08 VITALS
HEART RATE: 84 BPM | OXYGEN SATURATION: 96 % | HEIGHT: 64 IN | BODY MASS INDEX: 24.12 KG/M2 | WEIGHT: 141.3 LBS | DIASTOLIC BLOOD PRESSURE: 68 MMHG | SYSTOLIC BLOOD PRESSURE: 174 MMHG

## 2020-01-08 DIAGNOSIS — Z00.00 MEDICARE ANNUAL WELLNESS VISIT, SUBSEQUENT: ICD-10-CM

## 2020-01-08 DIAGNOSIS — Z72.0 TOBACCO ABUSE: ICD-10-CM

## 2020-01-08 DIAGNOSIS — L40.9 PSORIASIS: ICD-10-CM

## 2020-01-08 DIAGNOSIS — Z12.39 BREAST CANCER SCREENING: ICD-10-CM

## 2020-01-08 DIAGNOSIS — I10 ESSENTIAL HYPERTENSION: Primary | ICD-10-CM

## 2020-01-08 DIAGNOSIS — R09.89 BRUIT OF RIGHT CAROTID ARTERY: ICD-10-CM

## 2020-01-08 PROCEDURE — 99214 OFFICE O/P EST MOD 30 MIN: CPT | Performed by: FAMILY MEDICINE

## 2020-01-08 PROCEDURE — G0439 PPPS, SUBSEQ VISIT: HCPCS | Performed by: FAMILY MEDICINE

## 2020-01-08 PROCEDURE — 96160 PT-FOCUSED HLTH RISK ASSMT: CPT | Performed by: FAMILY MEDICINE

## 2020-01-08 RX ORDER — TRIAMCINOLONE ACETONIDE 1 MG/G
CREAM TOPICAL 2 TIMES DAILY
Qty: 45 G | Refills: 1 | Status: SHIPPED | OUTPATIENT
Start: 2020-01-08 | End: 2021-06-07

## 2020-01-08 RX ORDER — LANOLIN ALCOHOL/MO/W.PET/CERES
1000 CREAM (GRAM) TOPICAL DAILY
COMMUNITY

## 2020-01-08 RX ORDER — LISINOPRIL 20 MG/1
20 TABLET ORAL DAILY
Qty: 90 TABLET | Refills: 3 | Status: SHIPPED | OUTPATIENT
Start: 2020-01-08 | End: 2021-02-25

## 2020-01-08 RX ORDER — BISOPROLOL FUMARATE 5 MG/1
5 TABLET, FILM COATED ORAL DAILY
Qty: 90 TABLET | Refills: 3 | Status: SHIPPED | OUTPATIENT
Start: 2020-01-08 | End: 2021-02-25

## 2020-01-08 RX ORDER — AMLODIPINE BESYLATE 5 MG/1
5 TABLET ORAL DAILY
Qty: 90 TABLET | Refills: 3 | Status: SHIPPED | OUTPATIENT
Start: 2020-01-08 | End: 2021-02-25

## 2020-01-08 NOTE — PROGRESS NOTES
The ABCs of the Annual Wellness Visit  Subsequent Medicare Wellness Visit    Chief Complaint   Patient presents with   • medication rf     BP med       Subjective   History of Present Illness:  Neeru Reid is a 80 y.o. female who presents for a Subsequent Medicare Wellness Visit.    HEALTH RISK ASSESSMENT    Recent Hospitalizations:  No hospitalization(s) within the last year.    Current Medical Providers:  Patient Care Team:  Jigar Sneed MD as PCP - General (Family Medicine)    Smoking Status:  Social History     Tobacco Use   Smoking Status Current Some Day Smoker   • Packs/day: 0.50   • Types: Cigarettes   Smokeless Tobacco Never Used       Alcohol Consumption:  Social History     Substance and Sexual Activity   Alcohol Use No       Depression Screen:   PHQ-2/PHQ-9 Depression Screening 1/8/2020   Little interest or pleasure in doing things 0   Feeling down, depressed, or hopeless 0   Total Score 0       Fall Risk Screen:  MARINEADI Fall Risk Assessment was completed, and patient is at LOW risk for falls.Assessment completed on:1/8/2020    Health Habits and Functional and Cognitive Screening:  Functional & Cognitive Status 1/8/2020   Do you have difficulty preparing food and eating? No   Do you have difficulty bathing yourself, getting dressed or grooming yourself? No   Do you have difficulty using the toilet? No   Do you have difficulty moving around from place to place? No   Do you have trouble with steps or getting out of a bed or a chair? No   Current Diet Well Balanced Diet   Dental Exam Up to date   Eye Exam Not up to date   Exercise (times per week) 0 times per week   Current Exercise Activities Include None   Do you need help using the phone?  No   Are you deaf or do you have serious difficulty hearing?  No   Do you need help with transportation? No   Do you need help shopping? No   Do you need help preparing meals?  No   Do you need help with housework?  No   Do you need help with laundry? No   Do you  need help taking your medications? No   Do you need help managing money? No   Do you ever drive or ride in a car without wearing a seat belt? No   Have you felt unusual stress, anger or loneliness in the last month? No   Who do you live with? Other   If you need help, do you have trouble finding someone available to you? No   Have you been bothered in the last four weeks by sexual problems? No   Do you have difficulty concentrating, remembering or making decisions? No         Does the patient have evidence of cognitive impairment? No    Asprin use counseling:Does not need ASA but is currently taking (advised patient that ASA is not indicated and patient chooses to stop it)    Age-appropriate Screening Schedule:  Refer to the list below for future screening recommendations based on patient's age, sex and/or medical conditions. Orders for these recommended tests are listed in the plan section. The patient has been provided with a written plan.    Health Maintenance   Topic Date Due   • MAMMOGRAM  01/30/2017   • COLONOSCOPY  01/30/2025   • INFLUENZA VACCINE  Addressed   • TDAP/TD VACCINES  Discontinued   • ZOSTER VACCINE  Discontinued          The following portions of the patient's history were reviewed and updated as appropriate: allergies, current medications, past family history, past medical history, past social history, past surgical history and problem list.    Outpatient Medications Prior to Visit   Medication Sig Dispense Refill   • aspirin 81 MG tablet Take 81 mg by mouth daily.     • Probiotic Product (PROBIOTIC DAILY PO) Take 1 tablet by mouth Daily.     • vitamin B-12 (CYANOCOBALAMIN) 1000 MCG tablet Take 1,000 mcg by mouth Daily.     • amLODIPine (NORVASC) 5 MG tablet TAKE 1 TABLET BY MOUTH DAILY 90 tablet 0   • bisoprolol (ZEBeta) 5 MG tablet TAKE 1 TABLET BY MOUTH DAILY 90 tablet 0   • lisinopril (PRINIVIL,ZESTRIL) 20 MG tablet TAKE 1 TABLET BY MOUTH DAILY. 90 tablet 0   • triamcinolone (KENALOG) 0.1 %  "cream Apply  topically to the appropriate area as directed 2 (Two) Times a Day. Affected rash aresas 45 g 1   • clobetasol (CLOBEX) 0.05 % lotion Apply  topically to the appropriate area as directed Daily. 118 mL 2   • cyclobenzaprine (FLEXERIL) 10 MG tablet Take 1 tablet by mouth 3 (Three) Times a Day As Needed for Muscle Spasms. 30 tablet 0   • diclofenac (VOLTAREN) 50 MG EC tablet Take 1 tablet by mouth 3 (Three) Times a Day. 30 tablet 0     No facility-administered medications prior to visit.        Patient Active Problem List   Diagnosis   • Abnormal finding on mammography   • CAFL (chronic airflow limitation) (CMS/[facility])   • Blood glucose elevated   • Essential hypertension   • Creatinine elevation   • Tobacco abuse   • Breast cancer screening   • Encounter for screening mammogram for malignant neoplasm of breast    • Eczema   • Psoriasis   • Bruit of right carotid artery   • Low serum HDL   • Medicare annual wellness visit, subsequent       Advanced Care Planning:  Patient has an advance directive - a copy has not been provided. Have asked the patient to send this to us to add to record    Review of Systems    Compared to one year ago, the patient feels her physical health is the same.  Compared to one year ago, the patient feels her mental health is the same.    Reviewed chart for potential of high risk medication in the elderly: yes  Reviewed chart for potential of harmful drug interactions in the elderly:yes    Objective         Vitals:    01/08/20 0851   BP: 174/68   BP Location: Left arm   Pulse: 84   SpO2: 96%   Weight: 64.1 kg (141 lb 4.8 oz)   Height: 162.6 cm (64.02\")       Body mass index is 24.24 kg/m².  Discussed the patient's BMI with her. The BMI is in the acceptable range.    Physical Exam          Assessment/Plan   Medicare Risks and Personalized Health Plan  CMS Preventative Services Quick Reference  Breast Cancer/Mammogram Screening  Immunizations Discussed/Encouraged (specific immunizations; " Pneumococcal 23 )  Lung Cancer Risk    The above risks/problems have been discussed with the patient.  Pertinent information has been shared with the patient in the After Visit Summary.  Follow up plans and orders are seen below in the Assessment/Plan Section.    Diagnoses and all orders for this visit:    1. Essential hypertension (Primary)  -     amLODIPine (NORVASC) 5 MG tablet; Take 1 tablet by mouth Daily.  Dispense: 90 tablet; Refill: 3  -     bisoprolol (ZEBeta) 5 MG tablet; Take 1 tablet by mouth Daily.  Dispense: 90 tablet; Refill: 3  -     lisinopril (PRINIVIL,ZESTRIL) 20 MG tablet; Take 1 tablet by mouth Daily.  Dispense: 90 tablet; Refill: 3  -     Comprehensive Metabolic Panel    2. Medicare annual wellness visit, subsequent  -     Mammo Screening Bilateral With CAD; Future    3. Breast cancer screening  -     Mammo Screening Bilateral With CAD; Future    4. Tobacco abuse    5. Psoriasis  -     triamcinolone (KENALOG) 0.1 % cream; Apply  topically to the appropriate area as directed 2 (Two) Times a Day. Affected rash aresas  Dispense: 45 g; Refill: 1    6. Bruit of right carotid artery  -     Duplex Carotid Ultrasound CAR; Future      Follow Up:  Return in about 6 months (around 7/8/2020), or if symptoms worsen or fail to improve, for Recheck, Next scheduled follow up.     An After Visit Summary and PPPS were given to the patient.       Follow-up management of chronic medical problems

## 2020-01-08 NOTE — PROGRESS NOTES
Neeru Reid is a 80 y.o. female.      Assessment/Plan   Problem List Items Addressed This Visit        Cardiovascular and Mediastinum    Essential hypertension - Primary    Relevant Medications    amLODIPine (NORVASC) 5 MG tablet    bisoprolol (ZEBeta) 5 MG tablet    lisinopril (PRINIVIL,ZESTRIL) 20 MG tablet    Other Relevant Orders    Comprehensive Metabolic Panel    Bruit of right carotid artery    Relevant Orders    Duplex Carotid Ultrasound CAR       Musculoskeletal and Integument    Psoriasis    Relevant Medications    triamcinolone (KENALOG) 0.1 % cream       Other    Tobacco abuse    Breast cancer screening    Relevant Orders    Mammo Screening Bilateral With CAD    Medicare annual wellness visit, subsequent    Relevant Orders    Mammo Screening Bilateral With CAD           Follow-up results of blood work mammogram carotid Doppler, noting blood pressure with a goal less than 140/90  Return in about 6 months (around 7/8/2020), or if symptoms worsen or fail to improve, for Recheck, Next scheduled follow up.      Chief Complaint   Patient presents with   • medication rf     BP med     Social History     Tobacco Use   • Smoking status: Current Some Day Smoker     Packs/day: 0.50     Types: Cigarettes   • Smokeless tobacco: Never Used   Substance Use Topics   • Alcohol use: No   • Drug use: Not on file       History of Present Illness   Patient follows up chronic problems hypertension tobacco psoriasis carotid bruit she has no acute concerns she is feels stable she like refill on her medications Home blood pressure readings less than 140/90 no chest pain no shortness of breath some flaring of the crisis that seems to be a factor ear canal more than her neck she like a refill the triamcinolone she has known carotid bruit she has not had an ultrasound she has no headache dizziness syncope,  She has difficulty with smoking cessation but can stop smoking when she is busy she has no cough no shortness of breath  The  "following portions of the patient's history were reviewed and updated as appropriate:PMHroutine: Social history , Allergies, Current Medications, Active Problem List and Health Maintenance    Review of Systems   Constitutional: Negative for activity change, appetite change and unexpected weight change.   HENT: Negative for nosebleeds and trouble swallowing.    Eyes: Negative for pain and visual disturbance.   Respiratory: Negative for chest tightness, shortness of breath and wheezing.    Cardiovascular: Negative for chest pain and palpitations.   Gastrointestinal: Negative for abdominal distention, abdominal pain and blood in stool.   Endocrine: Negative.    Genitourinary: Negative for difficulty urinating and hematuria.   Musculoskeletal: Negative for joint swelling.   Skin: Negative for color change and rash.   Allergic/Immunologic: Negative.    Neurological: Negative for dizziness, tremors, seizures, syncope, speech difficulty, weakness, light-headedness, numbness and headaches.   Hematological: Negative for adenopathy.   Psychiatric/Behavioral: Negative for agitation, confusion and sleep disturbance.   All other systems reviewed and are negative.      Objective   Vitals:    01/08/20 0851   BP: 174/68   BP Location: Left arm   Pulse: 84   SpO2: 96%   Weight: 64.1 kg (141 lb 4.8 oz)   Height: 162.6 cm (64.02\")     Body mass index is 24.24 kg/m².  Physical Exam   Constitutional: She is oriented to person, place, and time. She appears well-developed and well-nourished. No distress.   HENT:   Head: Normocephalic and atraumatic.   Eyes: Pupils are equal, round, and reactive to light. Conjunctivae and EOM are normal. Right eye exhibits no discharge. Left eye exhibits no discharge. No scleral icterus.   Neck: Normal range of motion. Neck supple. No tracheal deviation present. No thyromegaly present.   Cardiovascular: Normal rate, regular rhythm, normal heart sounds, intact distal pulses and normal pulses. Exam reveals " no gallop.   No murmur heard.  Pulses:       Carotid pulses are on the right side with bruit.  Pulmonary/Chest: Effort normal and breath sounds normal. No respiratory distress. She has no wheezes. She has no rales.   Abdominal: Soft. Bowel sounds are normal. She exhibits no distension and no mass. There is no tenderness. There is no rebound and no guarding. No hernia.   Musculoskeletal: Normal range of motion. She exhibits no edema.   Neurological: She is alert and oriented to person, place, and time. She exhibits normal muscle tone. Coordination normal.   Skin: Skin is warm. No rash noted. No erythema. No pallor.   Psychiatric: She has a normal mood and affect. Her behavior is normal. Judgment and thought content normal.   Nursing note and vitals reviewed.    Reviewed Data:  No visits with results within 1 Month(s) from this visit.   Latest known visit with results is:   Office Visit on 05/10/2019   Component Date Value Ref Range Status   • Total Cholesterol 05/10/2019 131  0 - 200 mg/dL Final   • Triglycerides 05/10/2019 156* 0 - 150 mg/dL Final   • HDL Cholesterol 05/10/2019 38* 40 - 60 mg/dL Final   • VLDL Cholesterol 05/10/2019 31.2  mg/dL Final   • LDL Cholesterol  05/10/2019 62  0 - 100 mg/dL Final   • Glucose 05/10/2019 96  65 - 99 mg/dL Final   • BUN 05/10/2019 24* 8 - 23 mg/dL Final   • Creatinine 05/10/2019 1.19* 0.57 - 1.00 mg/dL Final   • eGFR Non African Am 05/10/2019 44* >60 mL/min/1.73 Final    Comment: The MDRD GFR formula is only valid for adults with stable  renal function between ages 18 and 70.     • eGFR  Am 05/10/2019 53* >60 mL/min/1.73 Final   • BUN/Creatinine Ratio 05/10/2019 20.2  7.0 - 25.0 Final   • Sodium 05/10/2019 142  136 - 145 mmol/L Final   • Potassium 05/10/2019 4.9  3.5 - 5.2 mmol/L Final   • Chloride 05/10/2019 107  98 - 107 mmol/L Final   • Total CO2 05/10/2019 23.4  22.0 - 29.0 mmol/L Final   • Calcium 05/10/2019 10.0  8.6 - 10.5 mg/dL Final   • Total Protein 05/10/2019  7.0  6.0 - 8.5 g/dL Final   • Albumin 05/10/2019 4.00  3.50 - 5.20 g/dL Final   • Globulin 05/10/2019 3.0  gm/dL Final   • A/G Ratio 05/10/2019 1.3  g/dL Final   • Total Bilirubin 05/10/2019 0.3  0.2 - 1.2 mg/dL Final   • Alkaline Phosphatase 05/10/2019 97  39 - 117 U/L Final   • AST (SGOT) 05/10/2019 21  1 - 32 U/L Final   • ALT (SGPT) 05/10/2019 17  1 - 33 U/L Final

## 2020-01-23 ENCOUNTER — HOSPITAL ENCOUNTER (OUTPATIENT)
Dept: CARDIOLOGY | Facility: HOSPITAL | Age: 80
Discharge: HOME | End: 2020-01-23
Admitting: FAMILY MEDICINE

## 2020-01-23 DIAGNOSIS — R09.89 BRUIT OF RIGHT CAROTID ARTERY: ICD-10-CM

## 2020-01-23 LAB
BH CV XLRA MEAS LEFT DIST CCA EDV: 23.4 CM/SEC
BH CV XLRA MEAS LEFT DIST CCA PSV: 105.7 CM/SEC
BH CV XLRA MEAS LEFT DIST ICA EDV: 25.2 CM/SEC
BH CV XLRA MEAS LEFT DIST ICA PSV: 100.9 CM/SEC
BH CV XLRA MEAS LEFT ICA/CCA RATIO: 1.12
BH CV XLRA MEAS LEFT MID ICA EDV: -31.8 CM/SEC
BH CV XLRA MEAS LEFT MID ICA PSV: -118.2 CM/SEC
BH CV XLRA MEAS LEFT PROX CCA EDV: 17.3 CM/SEC
BH CV XLRA MEAS LEFT PROX CCA PSV: 95.3 CM/SEC
BH CV XLRA MEAS LEFT PROX ECA EDV: -7.3 CM/SEC
BH CV XLRA MEAS LEFT PROX ECA PSV: -174.4 CM/SEC
BH CV XLRA MEAS LEFT PROX ICA EDV: 29.8 CM/SEC
BH CV XLRA MEAS LEFT PROX ICA PSV: 108.2 CM/SEC
BH CV XLRA MEAS LEFT PROX SCLA PSV: 164.6 CM/SEC
BH CV XLRA MEAS LEFT VERTEBRAL A EDV: -19.3 CM/SEC
BH CV XLRA MEAS LEFT VERTEBRAL A PSV: -66.5 CM/SEC
BH CV XLRA MEAS RIGHT CCA RATIO VEL: 60 CM/SEC
BH CV XLRA MEAS RIGHT DIST CCA EDV: 14.7 CM/SEC
BH CV XLRA MEAS RIGHT DIST CCA PSV: 60 CM/SEC
BH CV XLRA MEAS RIGHT DIST ICA EDV: -29.9 CM/SEC
BH CV XLRA MEAS RIGHT DIST ICA PSV: -116.8 CM/SEC
BH CV XLRA MEAS RIGHT ICA RATIO VEL: -158 CM/SEC
BH CV XLRA MEAS RIGHT ICA/CCA RATIO: -2.6
BH CV XLRA MEAS RIGHT MID ICA EDV: 26.7 CM/SEC
BH CV XLRA MEAS RIGHT MID ICA PSV: 111.3 CM/SEC
BH CV XLRA MEAS RIGHT PROX CCA EDV: -16.8 CM/SEC
BH CV XLRA MEAS RIGHT PROX CCA PSV: -80.8 CM/SEC
BH CV XLRA MEAS RIGHT PROX ECA EDV: 58.5 CM/SEC
BH CV XLRA MEAS RIGHT PROX ECA PSV: 570.7 CM/SEC
BH CV XLRA MEAS RIGHT PROX ICA EDV: -33.9 CM/SEC
BH CV XLRA MEAS RIGHT PROX ICA PSV: -157.9 CM/SEC
BH CV XLRA MEAS RIGHT PROX SCLA PSV: 151.2 CM/SEC
BH CV XLRA MEAS RIGHT VERTEBRAL A EDV: -14.5 CM/SEC
BH CV XLRA MEAS RIGHT VERTEBRAL A PSV: -58.8 CM/SEC
LEFT ARM BP: NORMAL MMHG
RIGHT ARM BP: NORMAL MMHG

## 2020-01-23 PROCEDURE — 93880 EXTRACRANIAL BILAT STUDY: CPT

## 2020-01-27 DIAGNOSIS — R74.8 LOW SERUM HDL: Primary | ICD-10-CM

## 2021-02-24 DIAGNOSIS — I10 ESSENTIAL HYPERTENSION: ICD-10-CM

## 2021-02-25 DIAGNOSIS — I10 ESSENTIAL HYPERTENSION: ICD-10-CM

## 2021-02-25 RX ORDER — BISOPROLOL FUMARATE 5 MG/1
5 TABLET, FILM COATED ORAL DAILY
Qty: 30 TABLET | Refills: 0 | Status: SHIPPED | OUTPATIENT
Start: 2021-02-25 | End: 2021-02-26

## 2021-02-25 RX ORDER — LISINOPRIL 20 MG/1
20 TABLET ORAL DAILY
Qty: 30 TABLET | Refills: 0 | Status: SHIPPED | OUTPATIENT
Start: 2021-02-25 | End: 2021-02-26

## 2021-02-25 RX ORDER — AMLODIPINE BESYLATE 5 MG/1
5 TABLET ORAL DAILY
Qty: 30 TABLET | Refills: 0 | Status: SHIPPED | OUTPATIENT
Start: 2021-02-25 | End: 2021-02-26

## 2021-02-26 RX ORDER — BISOPROLOL FUMARATE 5 MG/1
5 TABLET, FILM COATED ORAL DAILY
Qty: 30 TABLET | Refills: 0 | Status: SHIPPED | OUTPATIENT
Start: 2021-02-26 | End: 2021-06-03 | Stop reason: SDUPTHER

## 2021-02-26 RX ORDER — LISINOPRIL 20 MG/1
20 TABLET ORAL DAILY
Qty: 30 TABLET | Refills: 0 | Status: SHIPPED | OUTPATIENT
Start: 2021-02-26 | End: 2021-06-03 | Stop reason: SDUPTHER

## 2021-02-26 RX ORDER — AMLODIPINE BESYLATE 5 MG/1
5 TABLET ORAL DAILY
Qty: 30 TABLET | Refills: 0 | Status: SHIPPED | OUTPATIENT
Start: 2021-02-26 | End: 2021-06-03 | Stop reason: SDUPTHER

## 2021-03-03 DIAGNOSIS — Z23 IMMUNIZATION DUE: ICD-10-CM

## 2021-06-03 ENCOUNTER — OFFICE VISIT (OUTPATIENT)
Dept: INTERNAL MEDICINE | Facility: CLINIC | Age: 81
End: 2021-06-03

## 2021-06-03 ENCOUNTER — LAB (OUTPATIENT)
Dept: LAB | Facility: HOSPITAL | Age: 81
End: 2021-06-03

## 2021-06-03 VITALS
SYSTOLIC BLOOD PRESSURE: 188 MMHG | WEIGHT: 141.4 LBS | HEART RATE: 58 BPM | OXYGEN SATURATION: 98 % | DIASTOLIC BLOOD PRESSURE: 72 MMHG | BODY MASS INDEX: 24.14 KG/M2 | HEIGHT: 64 IN

## 2021-06-03 DIAGNOSIS — R09.89 BRUIT OF RIGHT CAROTID ARTERY: ICD-10-CM

## 2021-06-03 DIAGNOSIS — Z00.00 MEDICARE ANNUAL WELLNESS VISIT, SUBSEQUENT: ICD-10-CM

## 2021-06-03 DIAGNOSIS — I10 ESSENTIAL HYPERTENSION: Primary | ICD-10-CM

## 2021-06-03 DIAGNOSIS — I65.21 STENOSIS OF RIGHT CAROTID ARTERY: ICD-10-CM

## 2021-06-03 DIAGNOSIS — Z72.0 TOBACCO ABUSE: ICD-10-CM

## 2021-06-03 DIAGNOSIS — R73.9 BLOOD GLUCOSE ELEVATED: ICD-10-CM

## 2021-06-03 LAB
ANION GAP SERPL CALCULATED.3IONS-SCNC: 9.4 MMOL/L (ref 5–15)
BUN SERPL-MCNC: 28 MG/DL (ref 8–23)
BUN/CREAT SERPL: 19.9 (ref 7–25)
CALCIUM SPEC-SCNC: 9.3 MG/DL (ref 8.6–10.5)
CHLORIDE SERPL-SCNC: 109 MMOL/L (ref 98–107)
CO2 SERPL-SCNC: 23.6 MMOL/L (ref 22–29)
CREAT SERPL-MCNC: 1.41 MG/DL (ref 0.57–1)
GFR SERPL CREATININE-BSD FRML MDRD: 36 ML/MIN/1.73
GLUCOSE SERPL-MCNC: 95 MG/DL (ref 65–99)
POTASSIUM SERPL-SCNC: 4.9 MMOL/L (ref 3.5–5.2)
SODIUM SERPL-SCNC: 142 MMOL/L (ref 136–145)

## 2021-06-03 PROCEDURE — 1126F AMNT PAIN NOTED NONE PRSNT: CPT | Performed by: FAMILY MEDICINE

## 2021-06-03 PROCEDURE — 36415 COLL VENOUS BLD VENIPUNCTURE: CPT | Performed by: FAMILY MEDICINE

## 2021-06-03 PROCEDURE — 80048 BASIC METABOLIC PNL TOTAL CA: CPT | Performed by: FAMILY MEDICINE

## 2021-06-03 PROCEDURE — 1160F RVW MEDS BY RX/DR IN RCRD: CPT | Performed by: FAMILY MEDICINE

## 2021-06-03 PROCEDURE — 96160 PT-FOCUSED HLTH RISK ASSMT: CPT | Performed by: FAMILY MEDICINE

## 2021-06-03 PROCEDURE — 1170F FXNL STATUS ASSESSED: CPT | Performed by: FAMILY MEDICINE

## 2021-06-03 PROCEDURE — G0439 PPPS, SUBSEQ VISIT: HCPCS | Performed by: FAMILY MEDICINE

## 2021-06-03 PROCEDURE — 99214 OFFICE O/P EST MOD 30 MIN: CPT | Performed by: FAMILY MEDICINE

## 2021-06-03 RX ORDER — LISINOPRIL 20 MG/1
20 TABLET ORAL DAILY
Qty: 90 TABLET | Refills: 2 | Status: SHIPPED | OUTPATIENT
Start: 2021-06-03 | End: 2022-04-19

## 2021-06-03 RX ORDER — BISOPROLOL FUMARATE 5 MG/1
5 TABLET, FILM COATED ORAL DAILY
Qty: 90 TABLET | Refills: 2 | Status: SHIPPED | OUTPATIENT
Start: 2021-06-03

## 2021-06-03 RX ORDER — AMLODIPINE BESYLATE 5 MG/1
7.5 TABLET ORAL DAILY
Qty: 135 TABLET | Refills: 2 | Status: SHIPPED | OUTPATIENT
Start: 2021-06-03

## 2021-06-03 NOTE — PROGRESS NOTES
"Chief Complaint  Hypertension    Subjective          Neeru Reid presents to [facility]  History of Present Illness  Patient follows up for ongoing management of chronic medical problems of hypertension carotid artery stenosis elevated glucose levels tobacco abuse  She does not check her blood pressure she feels great she has no chest pain shortness of breath increased fatigue  She is not interested in smoking cessation at this time  She has no change in weight no swelling of her lower extremities  Objective   Vital Signs:   BP (!) 188/72 (BP Location: Right arm, Patient Position: Sitting, Cuff Size: Adult)   Pulse 58   Ht 162.6 cm (64\")   Wt 64.1 kg (141 lb 6.4 oz)   SpO2 98%   BMI 24.27 kg/m²     Physical Exam  Vitals reviewed.   Constitutional:       Appearance: Normal appearance.   HENT:      Head: Normocephalic and atraumatic.   Cardiovascular:      Rate and Rhythm: Normal rate and regular rhythm.      Pulses: Normal pulses.      Heart sounds: Normal heart sounds.   Pulmonary:      Effort: Pulmonary effort is normal.      Breath sounds: Normal breath sounds.   Musculoskeletal:      Right lower leg: No edema.      Left lower leg: No edema.   Skin:     General: Skin is warm and dry.   Neurological:      General: No focal deficit present.      Mental Status: She is alert.   Psychiatric:         Mood and Affect: Mood normal.         Behavior: Behavior normal.         Thought Content: Thought content normal.         Judgment: Judgment normal.        Result Review :                 Assessment and Plan    Diagnoses and all orders for this visit:    1. Essential hypertension (Primary)  -     Basic Metabolic Panel  -     amLODIPine (NORVASC) 5 MG tablet; Take 1.5 tablets by mouth Daily.  Dispense: 135 tablet; Refill: 2  -     lisinopril (PRINIVIL,ZESTRIL) 20 MG tablet; Take 1 tablet by mouth Daily.  Dispense: 90 tablet; Refill: 2  -     bisoprolol (ZEBeta) 5 MG tablet; Take 1 " tablet by mouth Daily.  Dispense: 90 tablet; Refill: 2    2. Bruit of right carotid artery    3. Stenosis of right carotid artery    4. Medicare annual wellness visit, subsequent    5. Tobacco abuse    6. Blood glucose elevated    Hypertension increase amlodipine continue lisinopril bisoprolol  Follow-up results of BMP  Carotid artery stenosis monitor with ultrasound  In light of not achieving goal blood pressure would consider adding renal artery ultrasound as well  Recommend smoking cessatio monitor for signs and symptoms of carotid artery compromise    Follow Up   Return in about 3 months (around 9/3/2021), or if symptoms worsen or fail to improve, for Recheck, Next scheduled follow up.  Patient was given instructions and counseling regarding her condition or for health maintenance advice. Please see specific information pulled into the AVS if appropriate.

## 2021-06-03 NOTE — PROGRESS NOTES
The ABCs of the Annual Wellness Visit  Subsequent Medicare Wellness Visit    Chief Complaint   Patient presents with   • follow up to hypertension   • Medicare Wellness-subsequent       Subjective   History of Present Illness:  Neeru Reid is a 81 y.o. female who presents for a Subsequent Medicare Wellness Visit.    HEALTH RISK ASSESSMENT    Recent Hospitalizations:  No hospitalization(s) within the last year.    Current Medical Providers:  Patient Care Team:  Jigar Sneed MD as PCP - General (Family Medicine)    Smoking Status:  Social History     Tobacco Use   Smoking Status Current Some Day Smoker   • Packs/day: 0.50   • Types: Cigarettes   Smokeless Tobacco Never Used       Alcohol Consumption:  Social History     Substance and Sexual Activity   Alcohol Use No       Depression Screen:   PHQ-2/PHQ-9 Depression Screening 6/3/2021   Little interest or pleasure in doing things 0   Feeling down, depressed, or hopeless 0   Total Score 0       Fall Risk Screen:  MARINEADI Fall Risk Assessment was completed, and patient is at LOW risk for falls.Assessment completed on:6/3/2021    Health Habits and Functional and Cognitive Screening:  Functional & Cognitive Status 6/3/2021   Do you have difficulty preparing food and eating? No   Do you have difficulty bathing yourself, getting dressed or grooming yourself? No   Do you have difficulty using the toilet? No   Do you have difficulty moving around from place to place? No   Do you have trouble with steps or getting out of a bed or a chair? No   Current Diet Well Balanced Diet   Dental Exam Up to date   Eye Exam Not up to date   Exercise (times per week) 2 times per week   Current Exercises Include Walking   Current Exercise Activities Include -   Do you need help using the phone?  No   Are you deaf or do you have serious difficulty hearing?  No   Do you need help with transportation? No   Do you need help shopping? No   Do you need help preparing meals?  No   Do you need  help with housework?  No   Do you need help with laundry? No   Do you need help taking your medications? No   Do you need help managing money? No   Do you ever drive or ride in a car without wearing a seat belt? No   Have you felt unusual stress, anger or loneliness in the last month? No   Who do you live with? Other   If you need help, do you have trouble finding someone available to you? No   Have you been bothered in the last four weeks by sexual problems? No   Do you have difficulty concentrating, remembering or making decisions? No         Does the patient have evidence of cognitive impairment? No    Asprin use counseling:Taking ASA appropriately as indicated    Age-appropriate Screening Schedule:  Refer to the list below for future screening recommendations based on patient's age, sex and/or medical conditions. Orders for these recommended tests are listed in the plan section. The patient has been provided with a written plan.    Health Maintenance   Topic Date Due   • DXA SCAN  Never done   • MAMMOGRAM  01/30/2017   • INFLUENZA VACCINE  08/01/2021   • TDAP/TD VACCINES  Discontinued   • ZOSTER VACCINE  Discontinued          The following portions of the patient's history were reviewed and updated as appropriate: allergies, current medications, past family history, past medical history, past social history, past surgical history and problem list.    Outpatient Medications Prior to Visit   Medication Sig Dispense Refill   • aspirin 81 MG tablet Take 81 mg by mouth daily.     • clobetasol (CLOBEX) 0.05 % lotion Apply  topically to the appropriate area as directed Daily. 118 mL 2   • Probiotic Product (PROBIOTIC DAILY PO) Take 1 tablet by mouth Daily.     • triamcinolone (KENALOG) 0.1 % cream Apply  topically to the appropriate area as directed 2 (Two) Times a Day. Affected rash aresas 45 g 1   • vitamin B-12 (CYANOCOBALAMIN) 1000 MCG tablet Take 1,000 mcg by mouth Daily.     • amLODIPine (NORVASC) 5 MG tablet  "TAKE 1 TABLET BY MOUTH DAILY 30 tablet 0   • bisoprolol (ZEBeta) 5 MG tablet TAKE 1 TABLET BY MOUTH DAILY 30 tablet 0   • lisinopril (PRINIVIL,ZESTRIL) 20 MG tablet TAKE 1 TABLET BY MOUTH DAILY 30 tablet 0     No facility-administered medications prior to visit.       Patient Active Problem List   Diagnosis   • Abnormal finding on mammography   • CAFL (chronic airflow limitation) (CMS/[facility])   • Blood glucose elevated   • Essential hypertension   • Creatinine elevation   • Tobacco abuse   • Breast cancer screening   • Encounter for screening mammogram for malignant neoplasm of breast    • Eczema   • Psoriasis   • Bruit of right carotid artery   • Low serum HDL   • Medicare annual wellness visit, subsequent   • Stenosis of right carotid artery       Advanced Care Planning:  ACP discussion was held with the patient during this visit. Patient has an advance directive (not in EMR), copy requested.    Review of Systems    Compared to one year ago, the patient feels her physical health is the same.  Compared to one year ago, the patient feels her mental health is the same.    Reviewed chart for potential of high risk medication in the elderly: yes  Reviewed chart for potential of harmful drug interactions in the elderly:yes    Objective         Vitals:    06/03/21 1420   BP: (!) 188/72   BP Location: Right arm   Patient Position: Sitting   Cuff Size: Adult   Pulse: 58   SpO2: 98%   Weight: 64.1 kg (141 lb 6.4 oz)   Height: 162.6 cm (64\")   PainSc: 0-No pain       Body mass index is 24.27 kg/m².  Discussed the patient's BMI with her. The BMI is in the acceptable range.    Physical Exam          Assessment/Plan   Medicare Risks and Personalized Health Plan  CMS Preventative Services Quick Reference  Advance Directive Discussion  Breast Cancer/Mammogram Screening  Cardiovascular risk  Lung Cancer Risk  Tobacco Use/Dependance (use dotphrase .tobaccocessation for documentation)    The above risks/problems have been discussed " with the patient.  Pertinent information has been shared with the patient in the After Visit Summary.  Follow up plans and orders are seen below in the Assessment/Plan Section.    Diagnoses and all orders for this visit:    1. Essential hypertension (Primary)  -     Basic Metabolic Panel  -     amLODIPine (NORVASC) 5 MG tablet; Take 1.5 tablets by mouth Daily.  Dispense: 135 tablet; Refill: 2  -     lisinopril (PRINIVIL,ZESTRIL) 20 MG tablet; Take 1 tablet by mouth Daily.  Dispense: 90 tablet; Refill: 2  -     bisoprolol (ZEBeta) 5 MG tablet; Take 1 tablet by mouth Daily.  Dispense: 90 tablet; Refill: 2    2. Bruit of right carotid artery    3. Stenosis of right carotid artery    4. Medicare annual wellness visit, subsequent    5. Tobacco abuse    6. Blood glucose elevated    Patient declines mammogram  Patient prefers to continue smoking  Follow Up:  Return in about 3 months (around 9/3/2021), or if symptoms worsen or fail to improve, for Recheck, Next scheduled follow up.     An After Visit Summary and PPPS were given to the patient.     Ongoing management of chronic medical problems

## 2021-06-05 DIAGNOSIS — L40.9 PSORIASIS: ICD-10-CM

## 2021-06-07 DIAGNOSIS — R79.89 CREATININE ELEVATION: Primary | ICD-10-CM

## 2021-06-07 RX ORDER — TRIAMCINOLONE ACETONIDE 1 MG/G
CREAM TOPICAL
Qty: 45 G | Refills: 1 | Status: SHIPPED | OUTPATIENT
Start: 2021-06-07

## 2021-08-05 ENCOUNTER — TELEPHONE (OUTPATIENT)
Dept: INTERNAL MEDICINE | Facility: CLINIC | Age: 81
End: 2021-08-05

## 2021-08-05 NOTE — TELEPHONE ENCOUNTER
Caller: Neeru Reid    Relationship: Self    Best call back number: [phone]    Caller requesting test results: SELF    What test was performed: LABS    When was the test performed: 06/03/2021    Where was the test performed: IN OFFICE

## 2021-09-08 ENCOUNTER — TELEPHONE (OUTPATIENT)
Dept: PHARMACY | Facility: HOSPITAL | Age: 81
End: 2021-09-08

## 2021-09-08 NOTE — TELEPHONE ENCOUNTER
Medication Adherence Call    Patient was called today to discuss medication adherence with lisinopril, as she was identified as having care opportunities.     she did not answer.    Christine Lawler, PharmD  Population Health Pharmacist  09/08/21

## 2022-04-19 ENCOUNTER — LAB (OUTPATIENT)
Dept: LAB | Facility: HOSPITAL | Age: 82
End: 2022-04-19

## 2022-04-19 ENCOUNTER — OFFICE VISIT (OUTPATIENT)
Dept: INTERNAL MEDICINE | Facility: CLINIC | Age: 82
End: 2022-04-19

## 2022-04-19 VITALS
BODY MASS INDEX: 23.52 KG/M2 | HEIGHT: 64 IN | HEART RATE: 60 BPM | OXYGEN SATURATION: 98 % | DIASTOLIC BLOOD PRESSURE: 60 MMHG | SYSTOLIC BLOOD PRESSURE: 159 MMHG | WEIGHT: 137.74 LBS

## 2022-04-19 DIAGNOSIS — Z12.31 ENCOUNTER FOR SCREENING MAMMOGRAM FOR MALIGNANT NEOPLASM OF BREAST: ICD-10-CM

## 2022-04-19 DIAGNOSIS — Z72.0 TOBACCO ABUSE: ICD-10-CM

## 2022-04-19 DIAGNOSIS — I10 ESSENTIAL HYPERTENSION: Primary | ICD-10-CM

## 2022-04-19 LAB
ALBUMIN SERPL-MCNC: 4 G/DL (ref 3.5–5.2)
ALBUMIN/GLOB SERPL: 1.1 G/DL
ALP SERPL-CCNC: 101 U/L (ref 39–117)
ALT SERPL W P-5'-P-CCNC: 18 U/L (ref 1–33)
ANION GAP SERPL CALCULATED.3IONS-SCNC: 12 MMOL/L (ref 5–15)
AST SERPL-CCNC: 23 U/L (ref 1–32)
BILIRUB SERPL-MCNC: 0.2 MG/DL (ref 0–1.2)
BUN SERPL-MCNC: 33 MG/DL (ref 8–23)
BUN/CREAT SERPL: 18.8 (ref 7–25)
CALCIUM SPEC-SCNC: 9.4 MG/DL (ref 8.6–10.5)
CHLORIDE SERPL-SCNC: 107 MMOL/L (ref 98–107)
CHOLEST SERPL-MCNC: 131 MG/DL (ref 0–200)
CO2 SERPL-SCNC: 22 MMOL/L (ref 22–29)
CREAT SERPL-MCNC: 1.76 MG/DL (ref 0.57–1)
EGFRCR SERPLBLD CKD-EPI 2021: 28.8 ML/MIN/1.73
GLOBULIN UR ELPH-MCNC: 3.5 GM/DL
GLUCOSE SERPL-MCNC: 91 MG/DL (ref 65–99)
HDLC SERPL-MCNC: 38 MG/DL (ref 40–60)
LDLC SERPL CALC-MCNC: 74 MG/DL (ref 0–100)
LDLC/HDLC SERPL: 1.9 {RATIO}
POTASSIUM SERPL-SCNC: 5 MMOL/L (ref 3.5–5.2)
PROT SERPL-MCNC: 7.5 G/DL (ref 6–8.5)
SODIUM SERPL-SCNC: 141 MMOL/L (ref 136–145)
TRIGL SERPL-MCNC: 104 MG/DL (ref 0–150)
VLDLC SERPL-MCNC: 19 MG/DL (ref 5–40)

## 2022-04-19 PROCEDURE — 36415 COLL VENOUS BLD VENIPUNCTURE: CPT | Performed by: FAMILY MEDICINE

## 2022-04-19 PROCEDURE — 99214 OFFICE O/P EST MOD 30 MIN: CPT | Performed by: FAMILY MEDICINE

## 2022-04-19 PROCEDURE — 80053 COMPREHEN METABOLIC PANEL: CPT | Performed by: FAMILY MEDICINE

## 2022-04-19 PROCEDURE — 80061 LIPID PANEL: CPT | Performed by: FAMILY MEDICINE

## 2022-04-19 RX ORDER — OLMESARTAN MEDOXOMIL 40 MG/1
40 TABLET ORAL DAILY
Qty: 30 TABLET | Refills: 3 | Status: SHIPPED | OUTPATIENT
Start: 2022-04-19

## 2022-04-25 ENCOUNTER — TRANSCRIBE ORDERS (OUTPATIENT)
Dept: ADMINISTRATIVE | Facility: HOSPITAL | Age: 82
End: 2022-04-25

## 2022-04-25 DIAGNOSIS — N28.9 RENAL INSUFFICIENCY: Primary | ICD-10-CM

## 2022-04-25 DIAGNOSIS — Z12.31 SCREENING MAMMOGRAM, ENCOUNTER FOR: Primary | ICD-10-CM

## 2022-04-29 NOTE — PROGRESS NOTES
"Chief Complaint  Back Pain (Lower back/) and follow up to hypertension    Subjective          Neeru Reid presents to [facility]  History of Present Illness  Patient follows up for ongoing management of hypertension and low back pain blood pressure is well controlled she has no chest pain shortness of breath or increased fatigue she has no loss of bowel or bladder function but mostly points to lumbosacral area with some minimal rating calling the buttocks there is no tenderness around the hips    Objective   Vital Signs:   /60 (BP Location: Left arm, Patient Position: Sitting, Cuff Size: Adult)   Pulse 60   Ht 162.6 cm (64\")   Wt 62.5 kg (137 lb 11.8 oz)   SpO2 98%   BMI 23.64 kg/m²     Physical Exam  Vitals and nursing note reviewed.   Constitutional:       General: She is not in acute distress.     Appearance: She is well-developed. She is not diaphoretic.   HENT:      Head: Normocephalic and atraumatic.   Eyes:      General: No scleral icterus.     Conjunctiva/sclera: Conjunctivae normal.      Pupils: Pupils are equal, round, and reactive to light.   Cardiovascular:      Rate and Rhythm: Normal rate and regular rhythm.      Heart sounds: Normal heart sounds. No murmur heard.    No gallop.   Pulmonary:      Effort: Pulmonary effort is normal. No respiratory distress.      Breath sounds: Normal breath sounds. No wheezing or rales.   Chest:      Chest wall: No tenderness.   Abdominal:      Palpations: Abdomen is soft.      Tenderness: There is no abdominal tenderness.   Musculoskeletal:         General: Tenderness present. No deformity.      Cervical back: Normal range of motion and neck supple.   Skin:     General: Skin is warm and dry.      Findings: No rash.   Neurological:      Mental Status: She is alert and oriented to person, place, and time.      Sensory: No sensory deficit.      Motor: No tremor, atrophy or abnormal muscle tone.      Coordination: Coordination " normal.      Deep Tendon Reflexes: Reflexes are normal and symmetric. Reflexes normal.      Reflex Scores:       Patellar reflexes are 2+ on the right side and 2+ on the left side.       Achilles reflexes are 2+ on the right side and 2+ on the left side.  Psychiatric:         Behavior: Behavior normal.         Thought Content: Thought content normal.         Judgment: Judgment normal.        Result Review :     Common labs    Common Labsle 6/3/21 4/19/22 4/19/22     1135 1135   Glucose 95 91    BUN 28 (A) 33 (A)    Creatinine 1.41 (A) 1.76 (A)    eGFR Non African Am 36 (A)     Sodium 142 141    Potassium 4.9 5.0    Chloride 109 (A) 107    Calcium 9.3 9.4    Albumin  4.00    Total Bilirubin  0.2    Alkaline Phosphatase  101    AST (SGOT)  23    ALT (SGPT)  18    Total Cholesterol   131   Triglycerides   104   HDL Cholesterol   38 (A)   LDL Cholesterol    74   (A) Abnormal value                      Assessment and Plan    Diagnoses and all orders for this visit:    1. Essential hypertension (Primary)  -     olmesartan (BENICAR) 40 MG tablet; Take 1 tablet by mouth Daily.  Dispense: 30 tablet; Refill: 3  -     Comprehensive Metabolic Panel  -     Lipid Panel    2. Encounter for screening mammogram for malignant neoplasm of breast   -     Mammo Screening Bilateral With CAD; Future    3. Tobacco abuse      BMI is within normal parameters. No follow-up required.  Increase olmesartan to 40 mg daily follow-up results of blood work  Recommended smoking cessation  Maneuvers shown for increasing low back stretching and strengthening.  Therapy if no improvement patient will call in 1 month update blood pressure readings otherwise       Follow Up   Return in about 3 months (around 7/19/2022), or if symptoms worsen or fail to improve, for Recheck.  Patient was given instructions and counseling regarding her condition or for health maintenance advice. Please see specific information pulled into the AVS if appropriate.

## 2022-08-20 ENCOUNTER — READMISSION MANAGEMENT (OUTPATIENT)
Dept: CALL CENTER | Facility: HOSPITAL | Age: 82
End: 2022-08-20

## 2022-08-20 NOTE — OUTREACH NOTE
Prep Survey    Flowsheet Row Responses   [facility] patient discharged from? Non-[facility]   Is LACE score < 7 ? Non-BH Discharge   Emergency Room discharge w/ pulse ox? No   Eligibility [facility]   Date of Discharge 08/19/22   Discharge Disposition Home or Self Care   Discharge diagnosis Unavailable   Does the patient have one of the following disease processes/diagnoses(primary or secondary)? Other   Prep survey completed? Yes          MARIA ELENA ZAPATA - Registered Nurse

## 2022-08-22 ENCOUNTER — TRANSITIONAL CARE MANAGEMENT TELEPHONE ENCOUNTER (OUTPATIENT)
Dept: CALL CENTER | Facility: HOSPITAL | Age: 82
End: 2022-08-22

## 2022-08-22 NOTE — OUTREACH NOTE
Call Center TCM Note    Flowsheet Row Responses   [facility] [place] by [facility] patient discharged from? Non-BH   Does the patient have one of the following disease processes/diagnoses(primary or secondary)? Other   TCM attempt successful? No   Unsuccessful attempts Attempt 1          Whitney Farley LPN    8/22/2022, 09:12 EDT

## 2022-08-22 NOTE — OUTREACH NOTE
Call Center TCM Note    Flowsheet Row Responses   [facility] patient discharged from? Non-BH   Does the patient have one of the following disease processes/diagnoses(primary or secondary)? Other   TCM attempt successful? No   Unsuccessful attempts Attempt 2          Allyson Jernigan LPN    8/22/2022, 14:35 CDT

## 2022-08-23 ENCOUNTER — TRANSITIONAL CARE MANAGEMENT TELEPHONE ENCOUNTER (OUTPATIENT)
Dept: CALL CENTER | Facility: HOSPITAL | Age: 82
End: 2022-08-23

## 2022-08-23 NOTE — OUTREACH NOTE
Call Center TCM Note    Flowsheet Row Responses   [facility] patient discharged from? Non-BH   Does the patient have one of the following disease processes/diagnoses(primary or secondary)? Other   TCM attempt successful? No   Unsuccessful attempts Attempt 3          Whitney Farley LPN    8/23/2022, 15:26 EDT